# Patient Record
Sex: FEMALE | Race: WHITE | Employment: FULL TIME | URBAN - METROPOLITAN AREA
[De-identification: names, ages, dates, MRNs, and addresses within clinical notes are randomized per-mention and may not be internally consistent; named-entity substitution may affect disease eponyms.]

---

## 2020-06-11 LAB — MAMMOGRAM: NORMAL

## 2020-12-10 ENCOUNTER — TRANSFERRED RECORDS (OUTPATIENT)
Dept: HEALTH INFORMATION MANAGEMENT | Facility: CLINIC | Age: 50
End: 2020-12-10

## 2020-12-10 ENCOUNTER — MEDICAL CORRESPONDENCE (OUTPATIENT)
Dept: HEALTH INFORMATION MANAGEMENT | Facility: CLINIC | Age: 50
End: 2020-12-10

## 2020-12-18 ENCOUNTER — TELEPHONE (OUTPATIENT)
Dept: OTOLARYNGOLOGY | Facility: CLINIC | Age: 50
End: 2020-12-18
Payer: MEDICARE

## 2020-12-18 NOTE — TELEPHONE ENCOUNTER
[facility]    Phone Message    May a detailed message be left on voicemail: no     Reason for Call: Appointment Intake    Referring Provider Name: Dr. Amanda Balderrama at [facility]   Diagnosis and/or Symptoms: Recent right Bell's palsy with symptoms of muffling of the sounds    Action Taken: Message routed to:  [facility] CLINIC COORDINATORS-ENT-EYE-DENTAL [[id number]]    Travel Screening: Not Applicable           Referral originally sent to [facility] however, they do not see for diagnosis.

## 2020-12-18 NOTE — TELEPHONE ENCOUNTER
Spoke with patient further about referral and patient states that she has noticed that her hearing has been decreasing and is constantly asking people to repeat what they are saying. Patient has also not had a hearing test in the last year and will be scheduled for that and to see our neurotologists. Patient is also interested in speaking with Dr Stafford in regards to her [place] Palsy. Informed patient that writer will send a message to our schedulers to help get her scheduled with our providers.    Em Campos, EMT

## 2020-12-22 NOTE — TELEPHONE ENCOUNTER
FUTURE VISIT INFORMATION      FUTURE VISIT INFORMATION:    Date: 1/19/2021    Time: 2PM    Location: [facility] [place]  REFERRAL INFORMATION:    Referring provider:  Amanda Balderrama MBBS      Referring providers clinic:  [facility]      Reason for visit/diagnosis  Recent right Bell's palsy // Ref by Dr. Amanda Balderrama at [facility]      RECORDS REQUESTED FROM:         Clinic name Comments Records Status Imaging Status   [facility]   12/16/2020 note from Brad James MD   Care Everywhere     [facility]   12/10/2020 note from Amanda Balderrama MBBS   Care Everywhere      [facility]   11/05/2020 note from Dustin Lema DO   Care Everywhere     [facility]    [address] [place] [address]    [phone] 11/05/2020 MRI NECK, ORBITS, FACE  Care everywhere  req 12/22 - PACS   [facility]  10/27/2020 ED note from Stefanie Amador MD   10/27/2020 MRI BRAIN        Care everywhere req 12/22 - PACS   [facility]  02/20/2019 MRI BRAIN Care everywhere req 12/22 - PACS

## 2020-12-22 NOTE — TELEPHONE ENCOUNTER
FUTURE VISIT INFORMATION      FUTURE VISIT INFORMATION:    Date: 1/13/2021    Time: 9:40AM    Location: [facility] [place]  REFERRAL INFORMATION:    Referring provider:  Amanda Balderrama MBBS      Referring providers clinic:  [facility]      Reason for visit/diagnosis  Recent right Bell's palsy // Ref by Dr. Amanda Balderrama at [facility]     RECORDS REQUESTED FROM:       Clinic name Comments Records Status Imaging Status   [facility]   12/16/2020 note from Brad James MD   Care Everywhere    [facility]   12/10/2020 note from Amanda Balderrama MBBS   Care Everywhere     [facility]   11/05/2020 note from Dustin Lema,    Care Everywhere    [facility]    [address] [place] [address]    [phone] 11/05/2020 MRI NECK, ORBITS, FACE  Care everywhere  req 12/22 - PACS   [facility]  10/27/2020 ED note from Stefanie Amador MD   10/27/2020 MRI BRAIN       Care everywhere req 12/22 - PACS   [facility]  02/20/2019 MRI BRAIN Care everywhere req 12/22 - PACS     12/22/2020 8:39AM sent a fax to [facility] and [facility] for images - Amay   12/22/2020 1:38PM received a fax back from [facility], 2/2019 MRI was done at [facility], sent a fax to [facility] for images. [place] images in PACS - amay   *images from [facility] received in PACS, called over to [facility] to see if they can push images, images will be PACS shortly - amay

## 2021-01-04 ENCOUNTER — TRANSCRIBE ORDERS (OUTPATIENT)
Dept: OTHER | Age: 51
End: 2021-01-04

## 2021-01-06 ENCOUNTER — DOCUMENTATION ONLY (OUTPATIENT)
Dept: CARE COORDINATION | Facility: CLINIC | Age: 51
End: 2021-01-06

## 2021-01-13 ENCOUNTER — OFFICE VISIT (OUTPATIENT)
Dept: OTOLARYNGOLOGY | Facility: CLINIC | Age: 51
End: 2021-01-13
Payer: COMMERCIAL

## 2021-01-13 ENCOUNTER — THERAPY VISIT (OUTPATIENT)
Dept: SPEECH THERAPY | Facility: CLINIC | Age: 51
End: 2021-01-13
Payer: COMMERCIAL

## 2021-01-13 ENCOUNTER — PRE VISIT (OUTPATIENT)
Dept: OTOLARYNGOLOGY | Facility: CLINIC | Age: 51
End: 2021-01-13

## 2021-01-13 VITALS
HEART RATE: 81 BPM | WEIGHT: 194 LBS | BODY MASS INDEX: 32.32 KG/M2 | OXYGEN SATURATION: 98 % | TEMPERATURE: 97 F | HEIGHT: 65 IN

## 2021-01-13 DIAGNOSIS — R47.1 DYSARTHRIA: ICD-10-CM

## 2021-01-13 DIAGNOSIS — R47.89 OTHER SPEECH DISTURBANCE: ICD-10-CM

## 2021-01-13 DIAGNOSIS — R13.11 ORAL PHASE DYSPHAGIA: Primary | ICD-10-CM

## 2021-01-13 DIAGNOSIS — G51.0 IDIOPATHIC FACIAL PARALYSIS: ICD-10-CM

## 2021-01-13 PROCEDURE — 99204 OFFICE O/P NEW MOD 45 MIN: CPT | Performed by: OTOLARYNGOLOGY

## 2021-01-13 PROCEDURE — 92522 EVALUATE SPEECH PRODUCTION: CPT | Mod: GN | Performed by: SPEECH-LANGUAGE PATHOLOGIST

## 2021-01-13 PROCEDURE — 92507 TX SP LANG VOICE COMM INDIV: CPT | Mod: GN | Performed by: SPEECH-LANGUAGE PATHOLOGIST

## 2021-01-13 RX ORDER — ATORVASTATIN CALCIUM 20 MG/1
TABLET, FILM COATED ORAL
COMMUNITY
Start: 2020-11-02

## 2021-01-13 ASSESSMENT — PAIN SCALES - GENERAL: PAINLEVEL: NO PAIN (0)

## 2021-01-13 ASSESSMENT — MIFFLIN-ST. JEOR: SCORE: 1495.86

## 2021-01-13 NOTE — NURSING NOTE
"Chief Complaint   Patient presents with     Consult     Right bells palsy       Pulse 81, temperature 97  F (36.1  C), temperature source Temporal, height 1.651 m (5' 5\"), weight 88 kg (194 lb), SpO2 98 %.    Em Campos, EMT  "

## 2021-01-13 NOTE — LETTER
1/13/2021       RE: Tesha Mcpherson  [address]  [place] [address]     Dear Dr. Balderrama,    Thank you for referring your patient, Tesha Mcpherson, to the [facility]. I was happy to see her today and to help her get comprehensive care with our facial rehab therapist. She will follow with her and then will see me as needed in the future. Please see a copy of my visit note below. Please reach out if you have any questions. [phone].     Facial Plastic and Reconstructive Surgery Consultation    Referring Provider:   Amanda Balderrama MD  [facility]  [address] [place] [address]    HPI:   I had the pleasure of seeing Tesha Mcpherson today in clinic for consultation for right facial paralysis.   Tesha Mcpherson is a 50 year old female who in October has sudden onset idiopathic facial paralysis. She noticed first that she could not purse her lips and was instructed to go to the ED where a stroke work up was negative. She had an MRI of the brain and an MRI of the Neck and this was significant for a known left tentorial meningioma. She completed two rounds of medications which included antiviral and prednisone.     She noticed return of function approximately 5-6 weeks after the onset and now feels significantly improved. She continues to struggle a bit with oral competence with closure and speech, but she states that this is also improved. She can close her eye but does feel that this is not fully recovered. She denies changes in vision.         Review Of Systems  ROS: 10 point ROS neg other than the symptoms noted above in the HPI.    Patient Active Problem List   Diagnosis     Osteogenesis imperfecta     TIBIALIS POSTERIOR TENDINITIS - right     Acquired pes planus of both feet     Unilateral complete paralysis of vocal cord     Past Surgical History:   Procedure Laterality Date     C PELVIS/HIP JOINT SURGERY UNLISTED  1983 (about)    Left, Plate and pins as  child     C STOMACH SURGERY PROCEDURE UNLISTED       C TOTAL HIP ARTHROPLASTY  2004    Left     FOOT SURGERY      right foot x 4     ROTATOR CUFF REPAIR RT/LT  12/23/11    right shoulder     THYROPLASTY  9/7/2012    Procedure: THYROPLASTY;  Left Thyroplasty * Latex Safe*;  Surgeon: Edin Lambert MD;  Location: [facility] OR     Current Outpatient Medications   Medication Sig Dispense Refill     acetaminophen (TYLENOL) 325 MG tablet take 1-2 Tabs by mouth every 6 hours as needed.       atorvastatin (LIPITOR) 20 MG tablet TAKE TWO TABLETS BY MOUTH DAILY IN THE MORNING       Cholecalciferol (VITAMIN D) 2000 UNIT tablet Take 1 tablet by mouth daily.       DULoxetine (CYMBALTA) 20 MG capsule Take 1 capsule by mouth 2 times daily.       ibuprofen (ADVIL,MOTRIN) 200 MG tablet Take 2-3 tablets by mouth every 4 hours as needed.       LYRICA 150 MG capsule Take 1 capsule by mouth 2 times daily.       omeprazole (PRILOSEC) 20 MG capsule Take 1 capsule by mouth daily.       baclofen (LIORESAL) 10 MG tablet Take 1 tablet by mouth 2 times daily.       fentaNYL (ACTIQ) 200 MCG lollipop Place 200 mcg inside cheek every 4 hours as needed. Non responsive pain       fentaNYL (DURAGESIC) 100 mcg/hr patch 72 hr Place 1 patch onto the skin every 72 hours.       HYDROmorphone (DILAUDID) 4 MG tablet Take 1 tablet by mouth as needed. For break through pain       hydrOXYzine (VISTARIL) 25 MG capsule Take 1-2 capsules by mouth as needed. For break through pain       oxyCODONE (ROXICODONE) 5 MG immediate release tablet Take 1-2 tablets by mouth every 6 hours as needed for other (Moderate to Severe Pain). (Patient not taking: Reported on 1/13/2021) 30 tablet 0     Sulfa drugs  Social History     Socioeconomic History     Marital status:      Spouse name: Not on file     Number of children: Not on file     Years of education: Not on file     Highest education level: Not on file   Occupational History     Not on file   Social Needs      Financial resource strain: Not on file     Food insecurity     Worry: Not on file     Inability: Not on file     Transportation needs     Medical: Not on file     Non-medical: Not on file   Tobacco Use     Smoking status: Never Smoker     Smokeless tobacco: Never Used   Substance and Sexual Activity     Alcohol use: No     Drug use: No     Sexual activity: Not on file   Lifestyle     Physical activity     Days per week: Not on file     Minutes per session: Not on file     Stress: Not on file   Relationships     Social connections     Talks on phone: Not on file     Gets together: Not on file     Attends Oriental orthodox service: Not on file     Active member of club or organization: Not on file     Attends meetings of clubs or organizations: Not on file     Relationship status: Not on file     Intimate partner violence     Fear of current or ex partner: Not on file     Emotionally abused: Not on file     Physically abused: Not on file     Forced sexual activity: Not on file   Other Topics Concern     Parent/sibling w/ CABG, MI or angioplasty before 65F 55M? Not Asked   Social History Narrative     Not on file     Family History   Problem Relation Age of Onset     Diabetes Mother      Diabetes Father      Diabetes Daughter        PE:  Alert and Oriented, Answering Questions Appropriately, Speech is nicely intelligible  Atraumatic, Normocephalic, Face Symmetric  Skin: Flores 2  Facial Nerve Intact and facial movement symmetric  EOM's, PEERL  Nasal Exam: No external Deformity, no mucopurulence or polyps, no masses  Chin: Normal   Lips/Teeth/Toungue/Gums: Lips intact, Normal Dentition, Occlusion intact, Oral mucosa intact, no lesions/ulcerations/masses, Tongue mobile  Chest: No wheezing, cyanosis, or stridor  Card: Normal upper extremity pulses and capillary refill, not diaphoretic  Neuro/Psych: CN's 2-12 intact with voluntary motion on the right with notable voluntary movements on the right. Right demonstrates 85% of  function from the left that is intact. Weak brow elevation and most notably 90% oral commissure excursion and decreased pucker. Moves all extremities, ambulation in intact, positive affect, no notable muscle weakness        Imaging: MRI available in PACS      IMPRESSION:    The primary encounter diagnosis was Oral phase dysphagia. A diagnosis of Dysarthria was also pertinent to this visit.   Right idiopathic facial paralysis with recovery  Incomplete recovery with need for facial rehabilitation          PLAN:        Orders Placed This Encounter   Procedures     SPEECH THERAPY REFERRAL     For rehabilitation of speech, eating and facial rehab to minimize sequelae of facial paralysis with recovery  I expect an appropriate recovery but did discuss the likely sequelae of synkinesis. I would have her return to me for follow up at that point if chemodenervation is needed.       Photodocumentation was obtained.     I spent a total of 35 minutes face-to-face with Tesha Mcpherson during today's office visit.  Over 50% of this time was spent counseling the patient and/or coordinating care regarding right facial paralysis.  See note for details.      Again, thank you for allowing me to participate in the care of your patient.      Sincerely,    Chasity Stafford MD

## 2021-01-13 NOTE — NURSING NOTE
Photo and video documentation obtained.    Referral placed for facial rehab with May Wilson.    Follow up with Dr. Stafford in 3 mos.    Dedra Reardon RN  1/13/2021 1:04 PM

## 2021-01-13 NOTE — PROGRESS NOTES
Facial Plastic and Reconstructive Surgery Consultation    Referring Provider:   Amanda Balderrama MD  [facility]  [address] [place] [address]    HPI:   I had the pleasure of seeing Tesha Mcpherson today in clinic for consultation for right facial paralysis.   Tesha Mcpherson is a 50 year old female who in October has sudden onset idiopathic facial paralysis. She noticed first that she could not purse her lips and was instructed to go to the ED where a stroke work up was negative. She had an MRI of the brain and an MRI of the Neck and this was significant for a known left tentorial meningioma. She completed two rounds of medications which included antiviral and prednisone.     She noticed return of function approximately 5-6 weeks after the onset and now feels significantly improved. She continues to struggle a bit with oral competence with closure and speech, but she states that this is also improved. She can close her eye but does feel that this is not fully recovered. She denies changes in vision.         Review Of Systems  ROS: 10 point ROS neg other than the symptoms noted above in the HPI.    Patient Active Problem List   Diagnosis     Osteogenesis imperfecta     TIBIALIS POSTERIOR TENDINITIS - right     Acquired pes planus of both feet     Unilateral complete paralysis of vocal cord     Past Surgical History:   Procedure Laterality Date     C PELVIS/HIP JOINT SURGERY UNLISTED  1983 (about)    Left, Plate and pins as child     C STOMACH SURGERY PROCEDURE UNLISTED       C TOTAL HIP ARTHROPLASTY  2004    Left     FOOT SURGERY      right foot x 4     ROTATOR CUFF REPAIR RT/LT  12/23/11    right shoulder     THYROPLASTY  9/7/2012    Procedure: THYROPLASTY;  Left Thyroplasty * Latex Safe*;  Surgeon: Edin Lambert MD;  Location: [facility] OR     Current Outpatient Medications   Medication Sig Dispense Refill     acetaminophen (TYLENOL) 325 MG tablet take 1-2 Tabs by mouth  every 6 hours as needed.       atorvastatin (LIPITOR) 20 MG tablet TAKE TWO TABLETS BY MOUTH DAILY IN THE MORNING       Cholecalciferol (VITAMIN D) 2000 UNIT tablet Take 1 tablet by mouth daily.       DULoxetine (CYMBALTA) 20 MG capsule Take 1 capsule by mouth 2 times daily.       ibuprofen (ADVIL,MOTRIN) 200 MG tablet Take 2-3 tablets by mouth every 4 hours as needed.       LYRICA 150 MG capsule Take 1 capsule by mouth 2 times daily.       omeprazole (PRILOSEC) 20 MG capsule Take 1 capsule by mouth daily.       baclofen (LIORESAL) 10 MG tablet Take 1 tablet by mouth 2 times daily.       fentaNYL (ACTIQ) 200 MCG lollipop Place 200 mcg inside cheek every 4 hours as needed. Non responsive pain       fentaNYL (DURAGESIC) 100 mcg/hr patch 72 hr Place 1 patch onto the skin every 72 hours.       HYDROmorphone (DILAUDID) 4 MG tablet Take 1 tablet by mouth as needed. For break through pain       hydrOXYzine (VISTARIL) 25 MG capsule Take 1-2 capsules by mouth as needed. For break through pain       oxyCODONE (ROXICODONE) 5 MG immediate release tablet Take 1-2 tablets by mouth every 6 hours as needed for other (Moderate to Severe Pain). (Patient not taking: Reported on 1/13/2021) 30 tablet 0     Sulfa drugs  Social History     Socioeconomic History     Marital status:      Spouse name: Not on file     Number of children: Not on file     Years of education: Not on file     Highest education level: Not on file   Occupational History     Not on file   Social Needs     Financial resource strain: Not on file     Food insecurity     Worry: Not on file     Inability: Not on file     Transportation needs     Medical: Not on file     Non-medical: Not on file   Tobacco Use     Smoking status: Never Smoker     Smokeless tobacco: Never Used   Substance and Sexual Activity     Alcohol use: No     Drug use: No     Sexual activity: Not on file   Lifestyle     Physical activity     Days per week: Not on file     Minutes per session:  Not on file     Stress: Not on file   Relationships     Social connections     Talks on phone: Not on file     Gets together: Not on file     Attends Zoroastrianism service: Not on file     Active member of club or organization: Not on file     Attends meetings of clubs or organizations: Not on file     Relationship status: Not on file     Intimate partner violence     Fear of current or ex partner: Not on file     Emotionally abused: Not on file     Physically abused: Not on file     Forced sexual activity: Not on file   Other Topics Concern     Parent/sibling w/ CABG, MI or angioplasty before 65F 55M? Not Asked   Social History Narrative     Not on file     Family History   Problem Relation Age of Onset     Diabetes Mother      Diabetes Father      Diabetes Daughter        PE:  Alert and Oriented, Answering Questions Appropriately, Speech is nicely intelligible  Atraumatic, Normocephalic, Face Symmetric  Skin: Flores 2  Facial Nerve Intact and facial movement symmetric  EOM's, PEERL  Nasal Exam: No external Deformity, no mucopurulence or polyps, no masses  Chin: Normal   Lips/Teeth/Toungue/Gums: Lips intact, Normal Dentition, Occlusion intact, Oral mucosa intact, no lesions/ulcerations/masses, Tongue mobile  Chest: No wheezing, cyanosis, or stridor  Card: Normal upper extremity pulses and capillary refill, not diaphoretic  Neuro/Psych: CN's 2-12 intact with voluntary motion on the right with notable voluntary movements on the right. Right demonstrates 85% of function from the left that is intact. Weak brow elevation and most notably 90% oral commissure excursion and decreased pucker. Moves all extremities, ambulation in intact, positive affect, no notable muscle weakness        Imaging: MRI available in PACS      IMPRESSION:    The primary encounter diagnosis was Oral phase dysphagia. A diagnosis of Dysarthria was also pertinent to this visit.   Right idiopathic facial paralysis with recovery  Incomplete recovery  with need for facial rehabilitation          PLAN:        Orders Placed This Encounter   Procedures     SPEECH THERAPY REFERRAL     For rehabilitation of speech, eating and facial rehab to minimize sequelae of facial paralysis with recovery  I expect an appropriate recovery but did discuss the likely sequelae of synkinesis. I would have her return to me for follow up at that point if chemodenervation is needed.       Photodocumentation was obtained.     I spent a total of 35 minutes face-to-face with Tesha Mcpherson during today's office visit.  Over 50% of this time was spent counseling the patient and/or coordinating care regarding right facial paralysis.  See note for details.

## 2021-01-13 NOTE — DISCHARGE INSTRUCTIONS
Your FGS today is 64/100!!  Try to complete 2x/day as you are able!    Management of ParesisStage    Paresis Massage  The point massage is to make the muscles more pliable for exercise, improve circulation, and prevent fluid from building up the hardening.    Using the finger pads of your first and index fingers, use firm but gently pressure to massage the following areas of both sides of the face for the designated length of time:  Forehead- 2 minutes   Temples- 1 minute   Cheek- 3 minutes (upper cheek moving back towards the ear for 1 minute, mid cheek moving back towards the ear for 1 minute, and lower cheek moving back towards the ear for 1 minute)  Chin- 1 minute     Key Points As We Consider Exercise  We will never attempt to exercise an area that is not yet innervated (i.e., has no movement).  Exercises should be slow and controlled.   Facial palsy exercises are a marathon, not a sprint.    Prioritize quality over quantity.   Never exercise to the point of fatigue.   Exercises should not hurt or make you feel uncomfortable afterward.    Don't get discouraged!!      Eye Management:    You may be having difficulty closing one or both of your eyes.  The inability to blink, of blink fully, may cause your eye to be dry and sore.  This can be painful and blur your vision. If you do not care for your eye it can cause infection or ulcers which may have a permanent impact on your vision.  It is important for you to take care of your eye at this stage and you may want to wear glasses, sunglasses, and avoid air conditioning that can cause excessive dryness.      Your eye doctor or general practitioner should have prescribed you with drops or spray to manage dryness during the day and gel ointment for use at night.  You should follow their recommendations consistently.      It is also important to establish eye closure at night to allow you to sleep and in order to prevent dryness and possible damage.  To establish  closure, you may want to consider using the following:  Taping (consider using 3M transpore tape 2.10 inch tape, Nexcare Blue Sensitive Skin Tape, MedVance Silicone Tape)  Moisture chamber goggles   Adhesive eye patches     Eyelid Stretches- completed 2x/day as able  Use your fingers (or an eyelash curler) on your top eyelashes, and gently pull your top lid down over the lower lid.    Use your middle finger (or your other hand's finger) to then stretch your eyebrow up a little.  Hold the lid stretched for 20-30 seconds   Release the lid and then, relax the lid back to the open eye position  Repeat 3x    Facial Exercises to Improve Eye Closure:    Active Assisted Eye Closure   Keep your head at chin level  Look down and keep looking down while you close your eyes  While closing both eyes, carefully and gently use a finger to assist the eyelid to completely close    Squeeze both eyes tightly shut for 5 seconds  Try to squeeze with your upper facial muscles only, keeping the lower face as relaxed as possible  Relax open   Repeat 10-20x; discontinue before 20 if the muscles feel tired    Exercises to Improve Lip Function     Active Assisted Pucker   Use the thumbs and index fingers to assist the lips into a symmetrical pucker at the same time that you use your muscles to pucker   WIth the lined area between the lips and nose centered with the bridge of the nose   Pretend to suck in through a skinny straw  Hold for 3-5 seconds  Relax   Repeat 10-20x; discontinue before 20 if the muscles feel tired    Active Assisted Pucker and Prolonged Kissing Sounds   Use the thumbs and index fingers to assist the lips into a symmetrical pucker at the same time that you use your muscles to pucker   WIth the lined area between the lips and nose centered with the bridge of the nose   Prolong a kissing sound   Hold for 3-5 seconds  Relax   Repeat 10-20x; discontinue before 20 if the muscles feel tired    Exercises to Improve Smile      Active Assisted Snarl   Using one finger placed flat and vertically next to nose (with the tip of the finger even with the top of the nostril  Gently assist this area to move straight upward while you use your muscles on both sides to make a  stinky face   Wrinkle the nose hard, trying to expose your upper teeth or gums equally on both sides  Hold for 5 seconds  Relax   Repeat 10-20x; discontinue before 20 if the muscles feel tired    Active Assisted Smile   Place four fingers or knuckles on an angle from the corner of the mouth up toward the cheek bone  Smile big on both sides, and at the same time, gently assist the smile on the affected side to look like the unaffected side   Hold for 3-5 seconds   Relax   Repeat 10-20x; discontinue before 20 if the muscles feel tired    Active Assisted to Active Smile with Teeth Showing   Place four fingers or knuckles on an angle from the corner of the mouth up toward the cheek bone  Smile big on both sides showing your teeth, and at the same time, gently assist the smile on the affected side to look like the unaffected side   Hold for 3-5 seconds   Slowly remove the fingers and keep smiling BIG for another 3-5 seconds  Think of something really good!!    Relax   Repeat 10-20x; discontinue before 20 if the muscles feel tired

## 2021-01-13 NOTE — PROGRESS NOTES
[facility]                                                                                   OUTPATIENT SPEECH LANGUAGE PATHOLOGY    PLAN OF TREATMENT FOR OUTPATIENT REHABILITATION   Patient's Last Name, First Name, Tesha Pacheco YOB: 1970   Provider's Name   [facility]   Medical Record No.  [id number]     Onset Date:  10/27/20 Start of Care Date:  01/13/21     Medical Diagnosis:   Oral phase dysphagia       SLP Treatment Diagnosis:     Communication Diagnosis Non-verbal communication impairment, minimal dysarthria    Swallowing Diagnosis Oral phase dysphagia     Plan of Treatment  Frequency/Duration:  1x/month  / up to 12 month's time      Certification date from  01/13/21   To 4/13/21        See note for plan of treatment details and functional goals     May Wilson, SLP                         I CERTIFY THE NEED FOR THESE SERVICES FURNISHED UNDER        THIS PLAN OF TREATMENT AND WHILE UNDER MY CARE     (Physician attestation of this document indicates review and certification of the therapy plan).              Referring Provider:  Dr. Haleigh Parra    Initial Assessment  See Epic Evaluation-                Speech-Language Pathology Department   EVALUATION  [facility]  FACIAL PARALYSIS EVALUATION          01/13/21 0945   Visit Type   Visit Type Initial   General Patient Information   Start Of Care Date 01/13/21   Referring Physician Chasity Stafford MD    Orders Eval And Treat   Orders Date 01/13/21   Orders Comment Dysarthria, dysphagia, other speech disturbances    Medical Diagnosis Oral phase dysphagia    Onset Of Illness/injury Or Date Of Surgery 10/27/20   Precautions/limitations No Known Precautions/limitations   Surgical/Medical History Reviewed Yes   Pertinent History Of Current Problem Pt reported that she woke up on 10/27 and realized that her lip felt fat  and then later realized that she couldnt pucker.  Pt called her MD who sent her to the ED and r/o tumor, stroke, aneurysm and was diagnosed with facial palsy.  Pt reported that she had initially been seeing a PT for her leg who perscribed an electrocurrent unit.  Pt reported that she had used it several times initially but discontinued because it was painful.  Pt notes that she has seen increased movement and function in her face since the point of onset which she is encouraged by.  The FACE Instrument was completed resulting in a score of 57/100.  The EAT-10 was also completed resulting in a score of 2/40.    Functional Problems Mild difficulty drinking-straw, anterior loss from the R side, spitting, eye irritated, improving speech but words still come out funny, reduced intelligibilty over the phone    Previous Treatment Physical/speech therapy;Medications  (Electrocurrent; 2 courses of steroids and antivirals )   General Health Surgery;Allergies;Headaches;TMJ  (Osteogenesis imperfecta, VF paralysis )   Diagnostic Tests MRI   Sensory Changes Improving sense of taste    Pain Description Quick pain every now and again    Hearing Changes Increased sensitivity ;Loss  (Ringing )   Eye Problems Dry eye   Oral Habits Unilateral chewing   Patient's Concerns dry eye;unusual sounds;difficulty drinking ;altered sense of taste;difficulty speaking ;difficulty with oral hygiene   Patient/Family Goals 'I just want to have the best chance of recovery to do everything that I need to do.'     Fall Risk Screen   Fall screen completed by SLP   Have you fallen 2 or more times in the past year? No   Have you fallen and had an injury in the past year? No   Fall screen comments Uses crutches at baseline and wheelchair for lengthier distances.    Evaluation Results: Resting Tone and Symmetry/Oral status   Palpebral Fissure - Type of Eye Tone  Wide  (1.0)   Nasolabial Fold  Less Pronounced  (1.0)   Nasolabial Fold - Type of Nasolabial Fold  Tone  Less Pronounced   Lips  - Type of Lip Tone  Normal  (0)   Type of Forehead Wrinkles Same   Type of Eye Wrinkles Same   Oral Rest Posture Inferior    Lingual Sensation Intact per pt report   Evaluation Results: Forehead Elevation   Evaluation Results: Forehead Elevation 3.5   Forehead Strength Rating % 62%   Forehead General Severity of Synkinesis   none   Evaluation Results: Minimal Effort Eye Closure    Evaluation Results: Minimal Effort Eye Closure  2 mm   Complete No   Strength Rating % 80%   General Severity of Synkinesis   none   Evaluation Results: Maximal Effort Eye Closure    Unable to Close Eye  no   Strength Rating % 100%   General Severity of Synkinesis   none   Evaluation Results: Open Mouth Smile    Evaluation Results: Open Mouth Smile  4.25   Open Smile Strength Rating % 80%   Open Smile General Severity of Synkinesis   none   Evaluation Results: Closed Mouth Smile    Closed Mouth Smile Strength Rating % 95%   Closed Mouth Smile General Severity of Synkinesis   none   Evaluation Results: Snarl   Evaluation Results Snarl 3.375   Snarl Strength Rating % 60%   Snarl General Severity of Synkinesis   none   Evaluation Results: Pucker   Evaluation Results: Pucker 3.375   Strength Rating % 60%   General Severity of Synkinesis   none   Evaluation Results: Speech Function   Evaluation Results: Speech Function Deviation of lips during speech to left, stronger side;Reduced lip movement on the right;Impaired labial sounds, (e.g. p, b, m, f, v)  (Baseline reduced loudness )   General Severity of Synkinesis with Sound-Lip Rounding None   General Severity of Synkinesis with Sound-Lip Pressure none   General Therapy Interventions   Planned Therapy Interventions Improve Facial Tone and Function;Improve Speech Intelligibilty;Oral Stage Swallowing Therapy   Clinical Impressions   Criteria for Skilled Therapeutic Interventions Met yes   Facial Grading Score 64/100   Communication Diagnosis Non-verbal communication  impairment, minimal dysarthria    Swallowing Diagnosis Oral phase dysphagia    Rehab Potential good to achieve stated therapy goal(s)   Therapy Frequency  1 time;per month   Predicted Duration of Therapy Intervention (days/weeks) up to 12 month's time    Risks and Benefits of Treatment have been explained yes   Patient, family and/or staff in agreement yes   Facial Paralysis Goals   Facial Paralysis Goals 1;2;3;4   Facial Paralysis Goal 1   Goal Identifier 1   Goal Description Pt will demonstrate a 10 point gain on her Facial Grading Score, per therapist judgement, reflecting gains in orofacial resting tone, voluntary movement, and minimization of synkinesis if present.     Target Date 04/13/21   Facial Paralysis Goal 2   Goal Identifier 2   Goal Description Pt will demonstrate a 10 point gain on her FACE Instrument relecting improved physical and social functioning.     Target Date 04/13/21   Facial Paralysis Goal 3   Goal Identifier 3   Goal Description Pt will demonstrate complete eye closure to prevent corneal abrasion and discomfort.     Target Date 04/13/21   Facial Paralysis Goal 4   Goal Identifier 4   Goal Description Pt will demonstrate, per therapist judgement, 25% increased labial ROM/symmetry during speech production.    Target Date 04/13/21   Total Evaluation Time   Sound production (artic, phonology, apraxia, dysarthria) Minutes (22459) 45   Total Evaluation Time 45   Therapy Certification   Certification date from 01/13/21   Certification date to 04/13/21   Medical Diagnosis Other speech disturbances    Certification I certify the need for these services furnished under this plan of treatment and while under my care.  (Physician co-signature of this document indicates review and certification of the therapy plan).     Thank you for the referral of Tesha Mcpherson.  If you have any questions about this report, please contact me using the information below.         May Wilson  MS  CCC-SLP    Speech Language Pathologist   Clinical Specialist Level 1   Rehabilitation Services  [facility] [address] [place] [address]  Office: [phone]  [email or web address]

## 2021-01-18 DIAGNOSIS — G51.0 IDIOPATHIC FACIAL PARALYSIS: Primary | ICD-10-CM

## 2021-01-18 NOTE — PATIENT INSTRUCTIONS
1. You were seen in the ENT Clinic today by Dr. Nissen.  If you have any questions or concerns after your appointment, please call   - Option 1: ENT Clinic: [phone]   - Option 2: [facility] (Dr.Nissen's Nurse): [phone]  2.   Plan to return to clinic as needed  Gladis Givens LPN  [facility] - Otolaryngology

## 2021-01-19 ENCOUNTER — PRE VISIT (OUTPATIENT)
Dept: OTOLARYNGOLOGY | Facility: CLINIC | Age: 51
End: 2021-01-19

## 2021-01-19 ENCOUNTER — OFFICE VISIT (OUTPATIENT)
Dept: OTOLARYNGOLOGY | Facility: CLINIC | Age: 51
End: 2021-01-19
Payer: COMMERCIAL

## 2021-01-19 ENCOUNTER — OFFICE VISIT (OUTPATIENT)
Dept: AUDIOLOGY | Facility: CLINIC | Age: 51
End: 2021-01-19
Attending: OTOLARYNGOLOGY
Payer: COMMERCIAL

## 2021-01-19 VITALS — RESPIRATION RATE: 13 BRPM | OXYGEN SATURATION: 97 % | TEMPERATURE: 97.3 F

## 2021-01-19 DIAGNOSIS — H93.8X1 EAR PRESSURE, RIGHT: ICD-10-CM

## 2021-01-19 DIAGNOSIS — H61.23 EXCESSIVE CERUMEN IN BOTH EAR CANALS: ICD-10-CM

## 2021-01-19 DIAGNOSIS — H90.3 SENSORY HEARING LOSS, BILATERAL: Primary | ICD-10-CM

## 2021-01-19 DIAGNOSIS — R29.810 WEAKNESS ON RIGHT SIDE OF FACE: Primary | ICD-10-CM

## 2021-01-19 DIAGNOSIS — G51.0 IDIOPATHIC FACIAL PARALYSIS: ICD-10-CM

## 2021-01-19 PROCEDURE — 92504 EAR MICROSCOPY EXAMINATION: CPT | Performed by: OTOLARYNGOLOGY

## 2021-01-19 PROCEDURE — 99203 OFFICE O/P NEW LOW 30 MIN: CPT | Mod: 25 | Performed by: OTOLARYNGOLOGY

## 2021-01-19 PROCEDURE — 92557 COMPREHENSIVE HEARING TEST: CPT | Performed by: AUDIOLOGIST-HEARING AID FITTER

## 2021-01-19 PROCEDURE — 92550 TYMPANOMETRY & REFLEX THRESH: CPT | Performed by: AUDIOLOGIST-HEARING AID FITTER

## 2021-01-19 ASSESSMENT — PAIN SCALES - GENERAL: PAINLEVEL: SEVERE PAIN (6)

## 2021-01-19 NOTE — PROGRESS NOTES
AUDIOLOGY REPORT    SUMMARY: Audiology visit completed. See audiogram for results.      RECOMMENDATIONS: Follow-up with ENT.    Pedro Love, CCC-A, [facility]  Licensed Audiologist  [place] #[id number]

## 2021-01-19 NOTE — PROGRESS NOTES
Dear Irma Gee:    I had the pleasure of meeting Tesha Mcpherson in consultation today at the [facility] at your request.    CHIEF COMPLAINT: Right facial weakness    HISTORY OF PRESENT ILLNESS: Patient is a 50-year-old in today for assessment of her right ear and right facial weakness.  In October she noticed some weakness develop on the right face.  She was seen in the emergency room and CT imaging showed no tumor present, diagnosed with Bell's palsy.  She was sent home.  Over the next several days the weakness progressed to paralysis.  She has been monitored since and imaging studies have been normal.  She was found to have a meningioma for which she is scheduled for surgery next week since it has shown some growth.  She has a diagnosis of osteogenesis imperfecta, has a lot of joint issues.  From an ear standpoint, she has had some right ear pressure, no significant pain.  She did not develop any ossicles around the ear auricle, has not had any ear drainage.  She feels the hearing is slightly muffled on the right side.  She denies any dizziness.  Does have tinnitus in both ears but more on the right.  Denies any facial paresthesias.  There is no eye pain or excessive watering.    ALLERGIES:    Allergies   Allergen Reactions     Banana Itching     Itchy throat     Sertraline      Other reaction(s): *Unknown  Other reaction(s): *Unknown       Sulfa Drugs      Fever, sick       HABITS: Social History    Substance and Sexual Activity      Alcohol use: No     History   Smoking Status     Never Smoker   Smokeless Tobacco     Never Used         PAST MEDICAL HISTORY: Please see today's intake form (for the remainder of the PMH) which I reviewed and signed.  Past Medical History:   Diagnosis Date     Chronic pain     hips and lower back, right shoulder and foot     CRPS (complex regional pain syndrome), lower limb     right foot     Osteogenesis imperfecta type I      PONV  (postoperative nausea and vomiting)      Reflux     gastric     Vocal cord paralysis     left       FAMILY HISTORY/SOCIAL HISTORY:   Family History   Problem Relation Age of Onset     Diabetes Mother      Diabetes Father      Diabetes Daughter       Social History     Socioeconomic History     Marital status:      Spouse name: Not on file     Number of children: Not on file     Years of education: Not on file     Highest education level: Not on file   Occupational History     Not on file   Social Needs     Financial resource strain: Not on file     Food insecurity     Worry: Not on file     Inability: Not on file     Transportation needs     Medical: Not on file     Non-medical: Not on file   Tobacco Use     Smoking status: Never Smoker     Smokeless tobacco: Never Used   Substance and Sexual Activity     Alcohol use: No     Drug use: No     Sexual activity: Not on file   Lifestyle     Physical activity     Days per week: Not on file     Minutes per session: Not on file     Stress: Not on file   Relationships     Social connections     Talks on phone: Not on file     Gets together: Not on file     Attends Confucianism service: Not on file     Active member of club or organization: Not on file     Attends meetings of clubs or organizations: Not on file     Relationship status: Not on file     Intimate partner violence     Fear of current or ex partner: Not on file     Emotionally abused: Not on file     Physically abused: Not on file     Forced sexual activity: Not on file   Other Topics Concern     Parent/sibling w/ CABG, MI or angioplasty before 65F 55M? Not Asked   Social History Narrative     Not on file       REVIEW OF SYSTEMS: [unfilled]    The remainder of the 10 point ROS is negative    PHYSICIAL EXAMINATION:  Constitutional: The patient was well-groomed and in no acute distress.   Skin: Warm and pink.  Psychiatric: The patient's affect was calm, cooperative, and appropriate.   Respiratory: Breathing  comfortably without stridor or exertion of accessory muscles.  Eyes: Pupils were equal and reactive. Extraocular movement intact.   Head: Normocephalic and atraumatic. No lesions or scars.  Ears: Patient placed under the microscope for microscopic evaluation and cleaning of cerumen which was obscuring full visualization and complete assessment of both TMs. Under high power magnification, the right ear was examined and cleaned of cerumen using curet, alligator forceps, and suction.  After cleaning, TM is fully visualized and has normal position with normal middle ear aeration. The left ear was then cleaned and inspected using microscope, instruments and similar techniques. After cleaning of cerumen, the TM has normal position with normal aeration to middle ear  Nose: Sinuses were nontender. Anterior rhinoscopy revealed midline septum and absence of purulence or polyps.  Oral Cavity: Normal tongue, floor of mouth, buccal mucosa, and palate. No lesions or masses on inspection or palpation. No abnormal lymph tissue in the oropharynx.   Neck: The parotid is soft without masses. Supple with normal laryngeal and tracheal landmarks.   Lymphatic: There is no palpable lymphadenopathy or other masses in the neck.   Neurologic: Alert and oriented x 3.  Cranial nerves all tests normal other than facial nerve.  Right facial shows a grade 2 weakness with good eye closure, mild midface weakness.  Voice quality normal.  Cerebellar Function Tests:  Grossly normal    Audiogram: Audiogram shows essentially normal hearing in both ears through all frequencies.  100% discrimination levels bilaterally.  Normal type A tympanograms bilaterally.      IMPRESSION AND PLAN:   1. Right facial weakness: Agree with her diagnosis of right Bell's palsy with significant improvement in movement with what sounds to be complete paralysis initially.  She is 3 months out and recommend continue to monitor.  She will follow-up in 9 months if any weakness or  concerns remain.  2. Right ear pressure: Normal exam today with normal tympanogram.  No treatment needed, monitor.  3. Right muffled hearing: Normal audiogram, no treatment needed, monitor.  4. Excessive cerumen: Cleaned today, no further treatment needed, monitor.    Thank you very much for the opportunity to participate in the care of your patient.    Rick L Nissen MD

## 2021-01-19 NOTE — LETTER
1/19/2021        RE: Tesha Mcpherson  [address]  [place] [address]     Dear Colleague,    Thank you for referring your patient, Tesha Mcpherson, to the [facility] [place] at [facility]. Please see a copy of my visit note below.    Dear Irma Gee:    I had the pleasure of meeting Tesha Mcpherson in consultation today at the [facility] Otol[facility] at your request.    CHIEF COMPLAINT: Right facial weakness    HISTORY OF PRESENT ILLNESS: Patient is a 50-year-old in today for assessment of her right ear and right facial weakness.  In October she noticed some weakness develop on the right face.  She was seen in the emergency room and CT imaging showed no tumor present, diagnosed with Bell's palsy.  She was sent home.  Over the next several days the weakness progressed to paralysis.  She has been monitored since and imaging studies have been normal.  She was found to have a meningioma for which she is scheduled for surgery next week since it has shown some growth.  She has a diagnosis of osteogenesis imperfecta, has a lot of joint issues.  From an ear standpoint, she has had some right ear pressure, no significant pain.  She did not develop any ossicles around the ear auricle, has not had any ear drainage.  She feels the hearing is slightly muffled on the right side.  She denies any dizziness.  Does have tinnitus in both ears but more on the right.  Denies any facial paresthesias.  There is no eye pain or excessive watering.    ALLERGIES:    Allergies   Allergen Reactions     Banana Itching     Itchy throat     Sertraline      Other reaction(s): *Unknown  Other reaction(s): *Unknown       Sulfa Drugs      Fever, sick       HABITS: Social History    Substance and Sexual Activity      Alcohol use: No     History   Smoking Status     Never Smoker   Smokeless Tobacco     Never Used         PAST MEDICAL HISTORY:  Please see today's intake form (for the remainder of the PMH) which I reviewed and signed.  Past Medical History:   Diagnosis Date     Chronic pain     hips and lower back, right shoulder and foot     CRPS (complex regional pain syndrome), lower limb     right foot     Osteogenesis imperfecta type I      PONV (postoperative nausea and vomiting)      Reflux     gastric     Vocal cord paralysis     left       FAMILY HISTORY/SOCIAL HISTORY:   Family History   Problem Relation Age of Onset     Diabetes Mother      Diabetes Father      Diabetes Daughter       Social History     Socioeconomic History     Marital status:      Spouse name: Not on file     Number of children: Not on file     Years of education: Not on file     Highest education level: Not on file   Occupational History     Not on file   Social Needs     Financial resource strain: Not on file     Food insecurity     Worry: Not on file     Inability: Not on file     Transportation needs     Medical: Not on file     Non-medical: Not on file   Tobacco Use     Smoking status: Never Smoker     Smokeless tobacco: Never Used   Substance and Sexual Activity     Alcohol use: No     Drug use: No     Sexual activity: Not on file   Lifestyle     Physical activity     Days per week: Not on file     Minutes per session: Not on file     Stress: Not on file   Relationships     Social connections     Talks on phone: Not on file     Gets together: Not on file     Attends Spiritism service: Not on file     Active member of club or organization: Not on file     Attends meetings of clubs or organizations: Not on file     Relationship status: Not on file     Intimate partner violence     Fear of current or ex partner: Not on file     Emotionally abused: Not on file     Physically abused: Not on file     Forced sexual activity: Not on file   Other Topics Concern     Parent/sibling w/ CABG, MI or angioplasty before 65F 55M? Not Asked   Social History Narrative     Not on file        REVIEW OF SYSTEMS: [unfilled]    The remainder of the 10 point ROS is negative    PHYSICIAL EXAMINATION:  Constitutional: The patient was well-groomed and in no acute distress.   Skin: Warm and pink.  Psychiatric: The patient's affect was calm, cooperative, and appropriate.   Respiratory: Breathing comfortably without stridor or exertion of accessory muscles.  Eyes: Pupils were equal and reactive. Extraocular movement intact.   Head: Normocephalic and atraumatic. No lesions or scars.  Ears: Patient placed under the microscope for microscopic evaluation and cleaning of cerumen which was obscuring full visualization and complete assessment of both TMs. Under high power magnification, the right ear was examined and cleaned of cerumen using curet, alligator forceps, and suction.  After cleaning, TM is fully visualized and has normal position with normal middle ear aeration. The left ear was then cleaned and inspected using microscope, instruments and similar techniques. After cleaning of cerumen, the TM has normal position with normal aeration to middle ear  Nose: Sinuses were nontender. Anterior rhinoscopy revealed midline septum and absence of purulence or polyps.  Oral Cavity: Normal tongue, floor of mouth, buccal mucosa, and palate. No lesions or masses on inspection or palpation. No abnormal lymph tissue in the oropharynx.   Neck: The parotid is soft without masses. Supple with normal laryngeal and tracheal landmarks.   Lymphatic: There is no palpable lymphadenopathy or other masses in the neck.   Neurologic: Alert and oriented x 3.  Cranial nerves all tests normal other than facial nerve.  Right facial shows a grade 2 weakness with good eye closure, mild midface weakness.  Voice quality normal.  Cerebellar Function Tests:  Grossly normal    Audiogram: Audiogram shows essentially normal hearing in both ears through all frequencies.  100% discrimination levels bilaterally.  Normal type A tympanograms  bilaterally.      IMPRESSION AND PLAN:   1. Right facial weakness: Agree with her diagnosis of right Bell's palsy with significant improvement in movement with what sounds to be complete paralysis initially.  She is 3 months out and recommend continue to monitor.  She will follow-up in 9 months if any weakness or concerns remain.  2. Right ear pressure: Normal exam today with normal tympanogram.  No treatment needed, monitor.  3. Right muffled hearing: Normal audiogram, no treatment needed, monitor.  4. Excessive cerumen: Cleaned today, no further treatment needed, monitor.    Thank you very much for the opportunity to participate in the care of your patient.    Rick L Nissen MD

## 2021-01-19 NOTE — NURSING NOTE
Chief Complaint   Patient presents with     Consult     [place] palsy right side causing hearing loss     Temperature 97.3  F (36.3  C), resp. rate 13, SpO2 97 %.    Tyree Abad LPN

## 2023-04-20 ENCOUNTER — LAB REQUISITION (OUTPATIENT)
Dept: LAB | Facility: CLINIC | Age: 53
End: 2023-04-20
Payer: COMMERCIAL

## 2023-04-20 DIAGNOSIS — R76.12 NONSPECIFIC REACTION TO CELL MEDIATED IMMUNITY MEASUREMENT OF GAMMA INTERFERON ANTIGEN RESPONSE WITHOUT ACTIVE TUBERCULOSIS: ICD-10-CM

## 2023-04-21 PROCEDURE — 36415 COLL VENOUS BLD VENIPUNCTURE: CPT | Mod: ORL | Performed by: EMERGENCY MEDICINE

## 2023-04-21 PROCEDURE — P9603 ONE-WAY ALLOW PRORATED MILES: HCPCS | Mod: ORL | Performed by: EMERGENCY MEDICINE

## 2023-04-21 PROCEDURE — 86481 TB AG RESPONSE T-CELL SUSP: CPT | Mod: ORL | Performed by: EMERGENCY MEDICINE

## 2023-04-24 LAB
GAMMA INTERFERON BACKGROUND BLD IA-ACNC: 0.05 IU/ML
M TB IFN-G BLD-IMP: NEGATIVE
M TB IFN-G CD4+ BCKGRND COR BLD-ACNC: 9.95 IU/ML
MITOGEN IGNF BCKGRD COR BLD-ACNC: 0 IU/ML
MITOGEN IGNF BCKGRD COR BLD-ACNC: 0.01 IU/ML
QUANTIFERON MITOGEN: 10 IU/ML
QUANTIFERON NIL TUBE: 0.05 IU/ML
QUANTIFERON TB1 TUBE: 0.05 IU/ML
QUANTIFERON TB2 TUBE: 0.06

## 2023-11-09 ENCOUNTER — LAB REQUISITION (OUTPATIENT)
Dept: LAB | Facility: CLINIC | Age: 53
End: 2023-11-09
Payer: COMMERCIAL

## 2023-11-09 DIAGNOSIS — R76.12 NONSPECIFIC REACTION TO CELL MEDIATED IMMUNITY MEASUREMENT OF GAMMA INTERFERON ANTIGEN RESPONSE WITHOUT ACTIVE TUBERCULOSIS: ICD-10-CM

## 2023-11-10 PROCEDURE — P9603 ONE-WAY ALLOW PRORATED MILES: HCPCS | Mod: ORL | Performed by: FAMILY MEDICINE

## 2023-11-10 PROCEDURE — 36415 COLL VENOUS BLD VENIPUNCTURE: CPT | Mod: ORL | Performed by: FAMILY MEDICINE

## 2023-11-10 PROCEDURE — 86481 TB AG RESPONSE T-CELL SUSP: CPT | Mod: ORL | Performed by: FAMILY MEDICINE

## 2023-11-11 ENCOUNTER — LAB REQUISITION (OUTPATIENT)
Dept: LAB | Facility: CLINIC | Age: 53
End: 2023-11-11
Payer: COMMERCIAL

## 2023-11-11 DIAGNOSIS — R30.0 DYSURIA: ICD-10-CM

## 2023-11-11 LAB
GAMMA INTERFERON BACKGROUND BLD IA-ACNC: 0.01 IU/ML
M TB IFN-G BLD-IMP: NEGATIVE
M TB IFN-G CD4+ BCKGRND COR BLD-ACNC: 3.66 IU/ML
MITOGEN IGNF BCKGRD COR BLD-ACNC: 0 IU/ML
MITOGEN IGNF BCKGRD COR BLD-ACNC: 0 IU/ML
QUANTIFERON MITOGEN: 3.67 IU/ML
QUANTIFERON NIL TUBE: 0.01 IU/ML
QUANTIFERON TB1 TUBE: 0.01 IU/ML
QUANTIFERON TB2 TUBE: 0.01

## 2023-11-11 PROCEDURE — 87086 URINE CULTURE/COLONY COUNT: CPT | Mod: ORL | Performed by: FAMILY MEDICINE

## 2023-11-11 PROCEDURE — 81001 URINALYSIS AUTO W/SCOPE: CPT | Mod: ORL | Performed by: FAMILY MEDICINE

## 2023-11-12 LAB
ALBUMIN UR-MCNC: 30 MG/DL
APPEARANCE UR: ABNORMAL
BACTERIA #/AREA URNS HPF: ABNORMAL /HPF
BILIRUB UR QL STRIP: NEGATIVE
COLOR UR AUTO: YELLOW
GLUCOSE UR STRIP-MCNC: NEGATIVE MG/DL
HGB UR QL STRIP: NEGATIVE
KETONES UR STRIP-MCNC: 10 MG/DL
LEUKOCYTE ESTERASE UR QL STRIP: ABNORMAL
MUCOUS THREADS #/AREA URNS LPF: PRESENT /LPF
NITRATE UR QL: NEGATIVE
PH UR STRIP: 8.5 [PH] (ref 5–7)
RBC URINE: 2 /HPF
SP GR UR STRIP: 1.02 (ref 1–1.03)
SQUAMOUS EPITHELIAL: 3 /HPF
UROBILINOGEN UR STRIP-MCNC: 2 MG/DL
WBC URINE: 107 /HPF

## 2023-11-14 LAB
BACTERIA UR CULT: ABNORMAL
BACTERIA UR CULT: ABNORMAL

## 2023-11-19 ENCOUNTER — LAB REQUISITION (OUTPATIENT)
Dept: LAB | Facility: CLINIC | Age: 53
End: 2023-11-19
Payer: COMMERCIAL

## 2023-11-19 DIAGNOSIS — D64.9 ANEMIA, UNSPECIFIED: ICD-10-CM

## 2023-11-20 LAB
ALBUMIN SERPL BCG-MCNC: 3.3 G/DL (ref 3.5–5.2)
ALP SERPL-CCNC: 97 U/L (ref 40–150)
ALT SERPL W P-5'-P-CCNC: 7 U/L (ref 0–50)
ANION GAP SERPL CALCULATED.3IONS-SCNC: 11 MMOL/L (ref 7–15)
AST SERPL W P-5'-P-CCNC: 21 U/L (ref 0–45)
BILIRUB SERPL-MCNC: <0.2 MG/DL
BUN SERPL-MCNC: 9.8 MG/DL (ref 6–20)
CALCIUM SERPL-MCNC: 8.8 MG/DL (ref 8.6–10)
CHLORIDE SERPL-SCNC: 105 MMOL/L (ref 98–107)
CREAT SERPL-MCNC: 0.42 MG/DL (ref 0.51–0.95)
DEPRECATED HCO3 PLAS-SCNC: 24 MMOL/L (ref 22–29)
EGFRCR SERPLBLD CKD-EPI 2021: >90 ML/MIN/1.73M2
ERYTHROCYTE [DISTWIDTH] IN BLOOD BY AUTOMATED COUNT: 16 % (ref 10–15)
GLUCOSE SERPL-MCNC: 94 MG/DL (ref 70–99)
HCT VFR BLD AUTO: 32.7 % (ref 35–47)
HGB BLD-MCNC: 9.9 G/DL (ref 11.7–15.7)
MCH RBC QN AUTO: 25.4 PG (ref 26.5–33)
MCHC RBC AUTO-ENTMCNC: 30.3 G/DL (ref 31.5–36.5)
MCV RBC AUTO: 84 FL (ref 78–100)
PLATELET # BLD AUTO: 421 10E3/UL (ref 150–450)
POTASSIUM SERPL-SCNC: 3.7 MMOL/L (ref 3.4–5.3)
PROT SERPL-MCNC: 5.8 G/DL (ref 6.4–8.3)
RBC # BLD AUTO: 3.89 10E6/UL (ref 3.8–5.2)
SODIUM SERPL-SCNC: 140 MMOL/L (ref 135–145)
WBC # BLD AUTO: 6.8 10E3/UL (ref 4–11)

## 2023-11-20 PROCEDURE — 85027 COMPLETE CBC AUTOMATED: CPT | Mod: ORL | Performed by: FAMILY MEDICINE

## 2023-11-20 PROCEDURE — 80053 COMPREHEN METABOLIC PANEL: CPT | Mod: ORL | Performed by: FAMILY MEDICINE

## 2023-11-20 PROCEDURE — P9603 ONE-WAY ALLOW PRORATED MILES: HCPCS | Mod: ORL | Performed by: FAMILY MEDICINE

## 2023-11-20 PROCEDURE — 36415 COLL VENOUS BLD VENIPUNCTURE: CPT | Mod: ORL | Performed by: FAMILY MEDICINE
